# Patient Record
Sex: MALE | Race: WHITE | NOT HISPANIC OR LATINO | Employment: FULL TIME | URBAN - METROPOLITAN AREA
[De-identification: names, ages, dates, MRNs, and addresses within clinical notes are randomized per-mention and may not be internally consistent; named-entity substitution may affect disease eponyms.]

---

## 2022-12-30 ENCOUNTER — HOSPITAL ENCOUNTER (EMERGENCY)
Facility: CLINIC | Age: 22
Discharge: HOME | End: 2022-12-30
Attending: EMERGENCY MEDICINE | Admitting: EMERGENCY MEDICINE
Payer: COMMERCIAL

## 2022-12-30 VITALS
HEIGHT: 69 IN | SYSTOLIC BLOOD PRESSURE: 144 MMHG | RESPIRATION RATE: 14 BRPM | OXYGEN SATURATION: 97 % | DIASTOLIC BLOOD PRESSURE: 82 MMHG | HEART RATE: 105 BPM | TEMPERATURE: 99.2 F | BODY MASS INDEX: 19.99 KG/M2 | WEIGHT: 135 LBS

## 2022-12-30 DIAGNOSIS — J10.1 INFLUENZA A: ICD-10-CM

## 2022-12-30 LAB
ALBUMIN UR-MCNC: 10 MG/DL
APPEARANCE UR: CLEAR
BILIRUB UR QL STRIP: NEGATIVE
COLOR UR AUTO: ABNORMAL
FLUAV RNA SPEC QL NAA+PROBE: POSITIVE
FLUBV RNA RESP QL NAA+PROBE: NEGATIVE
GLUCOSE UR STRIP-MCNC: NEGATIVE MG/DL
HGB UR QL STRIP: NEGATIVE
KETONES UR STRIP-MCNC: 10 MG/DL
LEUKOCYTE ESTERASE UR QL STRIP: NEGATIVE
MUCOUS THREADS #/AREA URNS LPF: PRESENT /LPF
NITRATE UR QL: NEGATIVE
PH UR STRIP: 8.5 [PH] (ref 5–7)
RBC URINE: 1 /HPF
RSV RNA SPEC NAA+PROBE: NEGATIVE
SARS-COV-2 RNA RESP QL NAA+PROBE: NEGATIVE
SP GR UR STRIP: 1.02 (ref 1–1.03)
SQUAMOUS EPITHELIAL: <1 /HPF
UROBILINOGEN UR STRIP-MCNC: NORMAL MG/DL
WBC URINE: 0 /HPF

## 2022-12-30 PROCEDURE — 87637 SARSCOV2&INF A&B&RSV AMP PRB: CPT | Performed by: EMERGENCY MEDICINE

## 2022-12-30 PROCEDURE — 81001 URINALYSIS AUTO W/SCOPE: CPT | Performed by: EMERGENCY MEDICINE

## 2022-12-30 PROCEDURE — 99283 EMERGENCY DEPT VISIT LOW MDM: CPT | Mod: CS | Performed by: EMERGENCY MEDICINE

## 2022-12-30 PROCEDURE — 250N000013 HC RX MED GY IP 250 OP 250 PS 637: Performed by: EMERGENCY MEDICINE

## 2022-12-30 PROCEDURE — C9803 HOPD COVID-19 SPEC COLLECT: HCPCS | Performed by: EMERGENCY MEDICINE

## 2022-12-30 PROCEDURE — 87086 URINE CULTURE/COLONY COUNT: CPT | Performed by: EMERGENCY MEDICINE

## 2022-12-30 PROCEDURE — 99284 EMERGENCY DEPT VISIT MOD MDM: CPT | Mod: CS | Performed by: EMERGENCY MEDICINE

## 2022-12-30 RX ORDER — OSELTAMIVIR PHOSPHATE 75 MG/1
75 CAPSULE ORAL 2 TIMES DAILY
Qty: 10 CAPSULE | Refills: 0 | Status: SHIPPED | OUTPATIENT
Start: 2022-12-30 | End: 2023-01-04

## 2022-12-30 RX ORDER — ACETAMINOPHEN 500 MG
1000 TABLET ORAL ONCE
Status: COMPLETED | OUTPATIENT
Start: 2022-12-30 | End: 2022-12-30

## 2022-12-30 RX ADMIN — ACETAMINOPHEN 1000 MG: 500 TABLET ORAL at 16:05

## 2022-12-30 ASSESSMENT — ACTIVITIES OF DAILY LIVING (ADL): ADLS_ACUITY_SCORE: 33

## 2022-12-30 NOTE — ED PROVIDER NOTES
"    [place] EMERGENCY DEPARTMENT ([facility])    12/30/22       ED PROVIDER NOTE   AC   History     Chief Complaint   Patient presents with     Flu Symptoms     Back Pain     HPI  Eladio Calix is a 22 year old male who presents with fever, chills, mild cough, runny nose, dysuria and low back pain as well as body aches. He went to urgent care and was sent here for further evaluation.  Symptoms started early this morning.  He denies any rash, no nausea, vomiting, chest pain, shortness of breath.  Denies underlying medical problems, no history of diabetes, asthma.  Flew into the [employer] early this morning for a concert.  States he has not had much of an appetite for the last couple of days, has not been eating well, is able to drink fluids, has a Gatorade in his hand during the interview.  States he has not been sexually active recently, has no concerns about sexually transmitted infection.  Denies any swelling, rash, lesions on the penis, denies any drainage.  Just noticed the dysuria today after the flu symptoms started.     Past Medical History  No past medical history on file.  No past surgical history on file.  oseltamivir (TAMIFLU) 75 MG capsule      No Known Allergies  Family History  No family history on file.  Social History       Past medical history, past surgical history, medications, allergies, family history, and social history were reviewed with the patient. No additional pertinent items.       Review of Systems  A complete review of systems was performed with pertinent positives and negatives noted in the HPI, and all other systems negative.    Physical Exam   BP: 113/79  Pulse: 111  Temp: (!) 102.5  F (39.2  C)  Resp: 17  Height: 175.3 cm (5' 9\")  Weight: 61.2 kg (135 lb)  SpO2: 100 %  Physical Exam    GEN:  Alert, well developed, no acute distress  HEENT:  PERRL, EOMI, Mucous membranes are moist.   Cardio:  RRR, no murmur, radial pulses equal bilaterally  PULM:  Lungs clear, good " air movement, no wheezes, rales   Abd:  Soft, normal bowel sounds, no focal tenderness  Back exam:  No CVA tenderness  Musculoskeletal:  normal range of motion, no lower extremity swelling or calf tenderness  Neuro:  Alert and oriented X3, Follows commands, moving all extremities spontaneously   Skin:  Warm, dry   ED Course      Procedures        Patient was given Tylenol in the ED and had significant improvement in his body aches, back pain, temperature.  States he feels much better.  Labs were reviewed by me and results are shown below.  Patient is positive for influenza A.  Since he is quite symptomatic, we will give him prescription for Tamiflu.  We discussed the limitations of Tamiflu including possible side effects with stomach upset.         Results for orders placed or performed during the hospital encounter of 12/30/22   Symptomatic Influenza A/B & SARS-CoV2 (COVID-19) Virus PCR Multiplex Nasopharyngeal     Status: Abnormal    Specimen: Nasopharyngeal; Swab   Result Value Ref Range    Influenza A PCR Positive (A) Negative    Influenza B PCR Negative Negative    RSV PCR Negative Negative    SARS CoV2 PCR Negative Negative    Narrative    Testing was performed using the Xpert Xpress CoV2/Flu/RSV Assay on the [employer] GeneXpert Instrument. This test should be ordered for the detection of SARS-CoV-2 and influenza viruses in individuals who meet clinical and/or epidemiological criteria. Test performance is unknown in asymptomatic patients. This test is for in vitro diagnostic use under the FDA EUA for laboratories certified under CLIA to perform high or moderate complexity testing. This test has not been FDA cleared or approved. A negative result does not rule out the presence of PCR inhibitors in the specimen or target RNA in concentration below the limit of detection for the assay. If only one viral target is positive but coinfection with multiple targets is suspected, the sample should be re-tested with  another FDA cleared, approved, or authorized test, if coinfection would change clinical management. This test was validated by the [facility] [employer]. These laboratories are certified under the Clinical Laboratory Improvement Amendments of 1988 (CLIA-88) as qualified to perform high complexity laboratory testing.   UA with Microscopic reflex to Culture     Status: Abnormal    Specimen: Urine, Midstream   Result Value Ref Range    Color Urine Light Yellow Colorless, Straw, Light Yellow, Yellow    Appearance Urine Clear Clear    Glucose Urine Negative Negative mg/dL    Bilirubin Urine Negative Negative    Ketones Urine 10 (A) Negative mg/dL    Specific Gravity Urine 1.020 1.003 - 1.035    Blood Urine Negative Negative    pH Urine 8.5 (H) 5.0 - 7.0    Protein Albumin Urine 10 (A) Negative mg/dL    Urobilinogen Urine Normal Normal, 2.0 mg/dL    Nitrite Urine Negative Negative    Leukocyte Esterase Urine Negative Negative    Mucus Urine Present (A) None Seen /LPF    RBC Urine 1 <=2 /HPF    WBC Urine 0 <=5 /HPF    Squamous Epithelials Urine <1 <=1 /HPF    Narrative    Urine Culture not indicated     Medications   acetaminophen (TYLENOL) tablet 1,000 mg (1,000 mg Oral Given 12/30/22 1605)        Assessments & Plan (with Medical Decision Making)   Healthy 22-year-old male presents with flulike symptoms, testing is positive for influenza A.  He is not high risk for complications or hospitalization.  Is quite symptomatic, was given a prescription for Tamiflu.  We discussed that it is likely to decrease the duration of the illness by about 24 hours and decrease the severity of the symptoms.  He was encouraged to continue with ibuprofen and Tylenol, return to the emergency department if any chest pain, shortness of breath, difficulty breathing, vomiting, any other concerns.    I have reviewed the nursing notes. I have reviewed the findings, diagnosis, plan and need for follow up with the patient.    Medical Decision  Making  The patient presented with a problem that is an acute illness with systemic symptoms.    The patient's evaluation involved:  ordering and review of 2 test(s) (see separate area of note for details)    The patient's management involved prescription drug management.        Discharge Medication List as of 12/30/2022  6:11 PM      START taking these medications    Details   oseltamivir (TAMIFLU) 75 MG capsule Take 1 capsule (75 mg) by mouth 2 times daily for 5 days, Disp-10 capsule, R-0, Local Print             Final diagnoses:   Influenza A       --    [facility] EMERGENCY DEPARTMENT  12/30/2022     Rhonda Link MD  12/30/22 0578

## 2022-12-30 NOTE — ED TRIAGE NOTES
22 yr old male arrives via walk in w/ complaints of flu like symptoms, fever, burning with urination, and lower back pain. Pt states he went to urgent care who sent him here. Pt taken sigifredo twice at 8 am and 12 pm.

## 2022-12-31 NOTE — DISCHARGE INSTRUCTIONS
Please make an appointment to follow up with Your Primary Care Provider in 7-10 days if not improving.    Please return to the Emergency Department if you have shortness of breath or difficulty breathing, chest pain, fainting spells, vomiting or any other concerns.  Take Tylenol and Ibuprofen every 6 hours, alternating, for the fever and body aches.

## 2023-01-01 LAB — BACTERIA UR CULT: NO GROWTH
